# Patient Record
Sex: FEMALE | Race: WHITE | URBAN - METROPOLITAN AREA
[De-identification: names, ages, dates, MRNs, and addresses within clinical notes are randomized per-mention and may not be internally consistent; named-entity substitution may affect disease eponyms.]

---

## 2020-06-15 ENCOUNTER — OFFICE VISIT (OUTPATIENT)
Dept: URBAN - METROPOLITAN AREA CLINIC 17 | Facility: CLINIC | Age: 70
End: 2020-06-15
Payer: COMMERCIAL

## 2020-06-15 PROCEDURE — 92004 COMPRE OPH EXAM NEW PT 1/>: CPT | Performed by: OPHTHALMOLOGY

## 2020-06-15 ASSESSMENT — VISUAL ACUITY: OS: 20/80

## 2020-06-15 ASSESSMENT — KERATOMETRY: OS: 42.63

## 2020-06-15 ASSESSMENT — INTRAOCULAR PRESSURE
OD: 7
OS: 11

## 2020-06-15 NOTE — IMPRESSION/PLAN
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 OD Plan: Discussed diagnosis in detail with patient. No treatment is required at this time. Will continue to observe condition and or symptoms. Patient instructed to use artificial tears as needed.

## 2020-06-15 NOTE — IMPRESSION/PLAN
Impression: Macular cyst, hole, or pseudohole, left eye: H35.342. Plan: Discussed diagnosis in detail with patient. Advised patient of condition. Discussed treatment options with patient. Consult recommended [[facility]].

## 2020-09-01 ENCOUNTER — OFFICE VISIT (OUTPATIENT)
Dept: URBAN - METROPOLITAN AREA CLINIC 17 | Facility: CLINIC | Age: 70
End: 2020-09-01
Payer: COMMERCIAL

## 2020-09-01 DIAGNOSIS — H44.50: ICD-10-CM

## 2020-09-01 DIAGNOSIS — H25.812 COMBINED FORMS OF AGE-RELATED CATARACT, LEFT EYE: ICD-10-CM

## 2020-09-01 DIAGNOSIS — H35.342 MACULAR CYST, HOLE, OR PSEUDOHOLE, LEFT EYE: Primary | ICD-10-CM

## 2020-09-01 PROCEDURE — 92134 CPTRZ OPH DX IMG PST SGM RTA: CPT | Performed by: OPHTHALMOLOGY

## 2020-09-01 PROCEDURE — 92014 COMPRE OPH EXAM EST PT 1/>: CPT | Performed by: OPHTHALMOLOGY

## 2020-09-01 ASSESSMENT — INTRAOCULAR PRESSURE
OS: 12
OD: 33

## 2020-09-01 NOTE — IMPRESSION/PLAN
Impression: Combined forms of age-related cataract, left eye: H25.812. Condition: established, worsening. Symptoms: could improve with surgery. Vision: vision affected. Plan: Cataract accounts for patient's complaints. Reviewed risks, benefits, and procedure. Patient desires surgery, schedule ce/iol OS, RL2, standard lens, distance refractive target, patient is clear for surgery in [place] [id number].

## 2020-09-01 NOTE — IMPRESSION/PLAN
Impression: Macular cyst, hole, or pseudohole, left eye: H35.342. Plan: Sx of VA loss 6-8 mos Do CE OS first then PPV

## 2020-09-01 NOTE — IMPRESSION/PLAN
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 Plan: OD gayatri from [address] since 1993 No pain or issues BsCan next time

## 2020-09-23 ENCOUNTER — PRE-OPERATIVE VISIT (OUTPATIENT)
Dept: URBAN - METROPOLITAN AREA CLINIC 17 | Facility: CLINIC | Age: 70
End: 2020-09-23
Payer: COMMERCIAL

## 2020-09-23 PROCEDURE — 92136 OPHTHALMIC BIOMETRY: CPT | Performed by: OPHTHALMOLOGY

## 2020-09-23 ASSESSMENT — PACHYMETRY
OS: 23.32
OS: 3.05

## 2020-10-01 ENCOUNTER — SURGERY (OUTPATIENT)
Dept: URBAN - METROPOLITAN AREA SURGERY 7 | Facility: SURGERY | Age: 70
End: 2020-10-01
Payer: COMMERCIAL

## 2020-10-01 PROCEDURE — 66984 XCAPSL CTRC RMVL W/O ECP: CPT | Performed by: OPHTHALMOLOGY

## 2020-10-02 ENCOUNTER — POST-OPERATIVE VISIT (OUTPATIENT)
Dept: URBAN - METROPOLITAN AREA CLINIC 17 | Facility: CLINIC | Age: 70
End: 2020-10-02
Payer: MEDICARE

## 2020-10-02 DIAGNOSIS — Z48.810 ENCOUNTER FOR SURGICAL AFTERCARE FOLLOWING SURGERY ON A SENSE ORGAN: Primary | ICD-10-CM

## 2020-10-02 PROCEDURE — 99024 POSTOP FOLLOW-UP VISIT: CPT | Performed by: OPTOMETRIST

## 2020-10-02 ASSESSMENT — INTRAOCULAR PRESSURE
OS: 20
OD: 4

## 2020-10-02 NOTE — IMPRESSION/PLAN
Impression: S/P CE/Standard IOL SA60WF +23.00 OS - 1 Day. Encounter for surgical aftercare following surgery on a sense organ  Z48.810.  Excellent post op course Plan: --Taper Pred-Moxi-Nepaf QID OS  x 1 wk, TID x 1 wk, BID x 1wk, QD x 1wk, then d/c

## 2020-10-08 ENCOUNTER — POST-OPERATIVE VISIT (OUTPATIENT)
Dept: URBAN - METROPOLITAN AREA CLINIC 17 | Facility: CLINIC | Age: 70
End: 2020-10-08
Payer: MEDICARE

## 2020-10-08 PROCEDURE — 99024 POSTOP FOLLOW-UP VISIT: CPT | Performed by: OPTOMETRIST

## 2020-10-08 ASSESSMENT — VISUAL ACUITY: OS: 20/80

## 2020-10-08 ASSESSMENT — INTRAOCULAR PRESSURE
OD: 13
OS: 15

## 2020-10-08 NOTE — IMPRESSION/PLAN
Impression: S/P CEC W/ IOL SA60WF +23.00 OS - 7 Days. Encounter for surgical aftercare following surgery on a sense organ  Z48.810. MAC OCT: mac hole OS Plan: RTC in 3 weeks for PO3, do not have to repeat MAC OCT Keep appt with Dr. Dontae Johnson in November --Taper Pred-Moxi-Nepaf TID OS x 1 wk, BID OS x 1wk, QD x 1wk, then d/c

## 2020-10-28 ENCOUNTER — POST-OPERATIVE VISIT (OUTPATIENT)
Dept: URBAN - METROPOLITAN AREA CLINIC 17 | Facility: CLINIC | Age: 70
End: 2020-10-28
Payer: MEDICARE

## 2020-10-28 PROCEDURE — 99024 POSTOP FOLLOW-UP VISIT: CPT | Performed by: OPTOMETRIST

## 2020-10-28 ASSESSMENT — VISUAL ACUITY: OS: 20/70

## 2020-10-28 ASSESSMENT — INTRAOCULAR PRESSURE
OS: 13
OD: 6

## 2020-10-28 NOTE — IMPRESSION/PLAN
Impression: S/P CEC W/ IOL SA60WF +23.00 OS - 27 Days. Encounter for surgical aftercare following surgery on a sense organ  Z48.810. Plan: RTC 1 mo retina consult as scheduled, then return to referring OD for routine vision care.  OS:
--Taper Pred-Moxi-Nepaf 1 gtt QD x1wk then d/c

## 2020-12-15 ENCOUNTER — OFFICE VISIT (OUTPATIENT)
Dept: URBAN - METROPOLITAN AREA CLINIC 17 | Facility: CLINIC | Age: 70
End: 2020-12-15
Payer: COMMERCIAL

## 2020-12-15 PROCEDURE — 92134 CPTRZ OPH DX IMG PST SGM RTA: CPT | Performed by: OPHTHALMOLOGY

## 2020-12-15 PROCEDURE — 92014 COMPRE OPH EXAM EST PT 1/>: CPT | Performed by: OPHTHALMOLOGY

## 2020-12-15 ASSESSMENT — INTRAOCULAR PRESSURE
OS: 13
OD: 6

## 2020-12-15 NOTE — IMPRESSION/PLAN
Impression: Macular cyst, hole, or pseudohole, left eye: H35.342. Plan:  R/B/A discussed -- she wants to proceed 25g PPV/ILMx/SF6 OS (RL 1)
 s/p CE/IOL, *monocular!